# Patient Record
Sex: MALE | Race: WHITE | URBAN - METROPOLITAN AREA
[De-identification: names, ages, dates, MRNs, and addresses within clinical notes are randomized per-mention and may not be internally consistent; named-entity substitution may affect disease eponyms.]

---
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
## 2018-01-05 PROBLEM — S43.109A ACROMIOCLAVICULAR JOINT SEPARATION: Status: RESOLVED | Noted: 2017-09-29 | Resolved: 2018-01-05

## 2018-01-05 NOTE — PROGRESS NOTES
Pt seen for initial evaluation and treatment session for grade 2 L ACJ separation and did not return to clinic for further followup visits. Current status is unknown and plan to discharge PT services.